# Patient Record
Sex: FEMALE | Race: WHITE | NOT HISPANIC OR LATINO | URBAN - METROPOLITAN AREA
[De-identification: names, ages, dates, MRNs, and addresses within clinical notes are randomized per-mention and may not be internally consistent; named-entity substitution may affect disease eponyms.]

---

## 2024-06-04 ENCOUNTER — APPOINTMENT (OUTPATIENT)
Dept: RADIOLOGY | Facility: HOSPITAL | Age: 89
End: 2024-06-04
Payer: MEDICARE

## 2024-06-04 ENCOUNTER — HOSPITAL ENCOUNTER (EMERGENCY)
Facility: HOSPITAL | Age: 89
Discharge: HOME | End: 2024-06-04
Payer: MEDICARE

## 2024-06-04 VITALS
SYSTOLIC BLOOD PRESSURE: 178 MMHG | RESPIRATION RATE: 18 BRPM | OXYGEN SATURATION: 96 % | DIASTOLIC BLOOD PRESSURE: 86 MMHG | TEMPERATURE: 96.4 F | HEART RATE: 75 BPM

## 2024-06-04 DIAGNOSIS — S01.81XA FACIAL LACERATION, INITIAL ENCOUNTER: Primary | ICD-10-CM

## 2024-06-04 DIAGNOSIS — S00.93XA TRAUMATIC CEPHALOHEMATOMA, INITIAL ENCOUNTER: ICD-10-CM

## 2024-06-04 PROCEDURE — 72125 CT NECK SPINE W/O DYE: CPT | Performed by: RADIOLOGY

## 2024-06-04 PROCEDURE — 70450 CT HEAD/BRAIN W/O DYE: CPT

## 2024-06-04 PROCEDURE — 2500000005 HC RX 250 GENERAL PHARMACY W/O HCPCS

## 2024-06-04 PROCEDURE — 72125 CT NECK SPINE W/O DYE: CPT

## 2024-06-04 PROCEDURE — 12011 RPR F/E/E/N/L/M 2.5 CM/<: CPT | Performed by: PHYSICIAN ASSISTANT

## 2024-06-04 PROCEDURE — 90715 TDAP VACCINE 7 YRS/> IM: CPT | Performed by: PHYSICIAN ASSISTANT

## 2024-06-04 PROCEDURE — 70450 CT HEAD/BRAIN W/O DYE: CPT | Performed by: RADIOLOGY

## 2024-06-04 PROCEDURE — 99285 EMERGENCY DEPT VISIT HI MDM: CPT | Mod: 25

## 2024-06-04 PROCEDURE — 2500000004 HC RX 250 GENERAL PHARMACY W/ HCPCS (ALT 636 FOR OP/ED): Performed by: PHYSICIAN ASSISTANT

## 2024-06-04 PROCEDURE — 90471 IMMUNIZATION ADMIN: CPT | Performed by: PHYSICIAN ASSISTANT

## 2024-06-04 RX ORDER — LIDOCAINE HYDROCHLORIDE 10 MG/ML
5 INJECTION INFILTRATION; PERINEURAL ONCE
Status: COMPLETED | OUTPATIENT
Start: 2024-06-04 | End: 2024-06-04

## 2024-06-04 RX ORDER — BACITRACIN ZINC 500 UNIT/G
OINTMENT (GRAM) TOPICAL 2 TIMES DAILY
Qty: 14 G | Refills: 0 | Status: SHIPPED | OUTPATIENT
Start: 2024-06-04 | End: 2024-06-11

## 2024-06-04 RX ORDER — LIDOCAINE HYDROCHLORIDE 10 MG/ML
INJECTION INFILTRATION; PERINEURAL
Status: COMPLETED
Start: 2024-06-04 | End: 2024-06-04

## 2024-06-04 RX ADMIN — LIDOCAINE HYDROCHLORIDE 50 MG: 10 INJECTION INFILTRATION; PERINEURAL at 11:20

## 2024-06-04 RX ADMIN — TETANUS TOXOID, REDUCED DIPHTHERIA TOXOID AND ACELLULAR PERTUSSIS VACCINE, ADSORBED 0.5 ML: 5; 2.5; 8; 8; 2.5 SUSPENSION INTRAMUSCULAR at 12:05

## 2024-06-04 RX ADMIN — LIDOCAINE HYDROCHLORIDE 50 MG: 10 INJECTION, SOLUTION INFILTRATION; PERINEURAL at 11:20

## 2024-06-04 ASSESSMENT — PAIN SCALES - GENERAL: PAINLEVEL_OUTOF10: 0 - NO PAIN

## 2024-06-04 ASSESSMENT — PAIN - FUNCTIONAL ASSESSMENT: PAIN_FUNCTIONAL_ASSESSMENT: 0-10

## 2024-06-04 ASSESSMENT — COLUMBIA-SUICIDE SEVERITY RATING SCALE - C-SSRS
2. HAVE YOU ACTUALLY HAD ANY THOUGHTS OF KILLING YOURSELF?: NO
6. HAVE YOU EVER DONE ANYTHING, STARTED TO DO ANYTHING, OR PREPARED TO DO ANYTHING TO END YOUR LIFE?: NO
1. IN THE PAST MONTH, HAVE YOU WISHED YOU WERE DEAD OR WISHED YOU COULD GO TO SLEEP AND NOT WAKE UP?: NO

## 2024-06-04 NOTE — ED TRIAGE NOTES
PT STATES THAT SHE HIT HER HEAD ON A DOOR FRAME TRIPPING OVER A PUPPY AND SPLIT HER FOREHEAD OPEN. PT DENIES VISUAL CHANGES, N/V, DIZZINESS, NUMBNESS/TINGLING, LOC, BT, AND HA.

## 2024-06-04 NOTE — ED PROVIDER NOTES
HPI     CC: Head Laceration     HPI: Marci Gregg is a 89 y.o. female with past medical history of reflux, hyperlipidemia, hypertension, and asthma presents with neighbor after hitting her head on the door frame.  Patient reports that around 6 AM this morning, she was getting up to use the bathroom when a dog that she is dog sitting got trapped underneath her feet and she tripped into the door frame with her face.  She did not lose consciousness and does not take any blood thinners.  She initially went to [facility] however the wait was too long and they had not seen her, so they went to urgent care who sent her here.  She currently has no complaints or areas of pain or tenderness.  Only concerned about her facial laceration.  Denies changes in vision, headache, nausea, vomiting, chest pain, shortness of breath.    ROS: 10-point review of systems was performed and is otherwise negative except as noted in HPI.      Past Medical History: Noncontributory except per HPI     Past Surgical History: Noncontributory except per HPI     Family History: Reviewed and noncontributory     Social History:  Noncontributory except per HPI       No Known Allergies    Home Meds:   Current Outpatient Medications   Medication Instructions    bacitracin 500 unit/gram ointment Topical, 2 times daily        ED Triage Vitals [06/04/24 0838]   Temperature Heart Rate Respirations BP   35.8 °C (96.4 °F) 65 18 173/79      Pulse Ox Temp Source Heart Rate Source Patient Position   97 % Temporal Monitor Sitting      BP Location FiO2 (%)     Left arm --         Heart Rate:  [65-75]   Temperature:  [35.8 °C (96.4 °F)]   Respirations:  [18]   BP: (173-178)/(79-86)   Pulse Ox:  [96 %-97 %]      Physical Exam:  Physical Exam  Vitals and nursing note reviewed.   Constitutional:       General: She is not in acute distress.     Appearance: Normal appearance. She is not ill-appearing.   HENT:      Head:      Comments: Cephalhematoma to the forehead with  approximately 3 cm linear laceration between the eyebrows.  No active bleeding.  No exposed tendon.  No muscle damage.      Right Ear: External ear normal.      Left Ear: External ear normal.      Nose: Nose normal.      Mouth/Throat:      Mouth: Mucous membranes are moist.   Eyes:      Extraocular Movements: Extraocular movements intact.      Conjunctiva/sclera: Conjunctivae normal.      Pupils: Pupils are equal, round, and reactive to light.   Cardiovascular:      Rate and Rhythm: Normal rate and regular rhythm.      Pulses: Normal pulses.   Pulmonary:      Effort: Pulmonary effort is normal. No respiratory distress.      Breath sounds: Normal breath sounds.   Abdominal:      General: Abdomen is flat.      Palpations: Abdomen is soft.      Tenderness: There is no abdominal tenderness.   Musculoskeletal:         General: No swelling, tenderness or deformity. Normal range of motion.      Cervical back: Normal range of motion and neck supple. No tenderness.   Skin:     General: Skin is warm and dry.      Capillary Refill: Capillary refill takes less than 2 seconds.   Neurological:      General: No focal deficit present.      Mental Status: She is alert and oriented to person, place, and time.   Psychiatric:         Mood and Affect: Mood normal.          Diagnostic Results        Labs Reviewed - No data to display      CT head wo IV contrast   Final Result   No acute intracranial hemorrhage or mass-effect.        Minimal air in the frontal scalp likely reflecting laceration in the   setting of trauma.        MACRO:   None.        Signed by: Yamel Denis 6/4/2024 10:08 AM   Dictation workstation:   [id number]      CT cervical spine wo IV contrast   Final Result   No cervical vertebral displacement or acute displaced fracture.   Straightening of the cervical lordosis and multilevel   productive/degenerative changes noted.        Paraspinal/soft tissue findings as above.        MACRO:   None.        Signed by: Yamel  Cira 6/4/2024 10:42 AM   Dictation workstation:   [id number]                    Shane Coma Scale Score: 15                  Procedure  Laceration Repair    Performed by: Zena Bernal PA-C  Authorized by: Zena Bernal PA-C    Consent:     Consent obtained:  Verbal    Consent given by:  Patient    Risks, benefits, and alternatives were discussed: yes      Risks discussed:  Infection, need for additional repair, pain, poor cosmetic result, retained foreign body and tendon damage    Alternatives discussed:  No treatment, delayed treatment and observation  Universal protocol:     Procedure explained and questions answered to patient or proxy's satisfaction: yes      Relevant documents present and verified: yes      Test results available: yes      Imaging studies available: yes      Required blood products, implants, devices, and special equipment available: yes      Site/side marked: yes      Immediately prior to procedure, a time out was called: yes      Patient identity confirmed:  Verbally with patient  Anesthesia:     Anesthesia method:  Local infiltration    Local anesthetic:  Lidocaine 1% w/o epi  Laceration details:     Location:  Face    Face location:  Forehead    Length (cm):  2.5  Pre-procedure details:     Preparation:  Patient was prepped and draped in usual sterile fashion and imaging obtained to evaluate for foreign bodies  Exploration:     Limited defect created (wound extended): no      Hemostasis achieved with:  Direct pressure    Imaging obtained: x-ray      Imaging outcome: foreign body not noted      Wound exploration: wound explored through full range of motion and entire depth of wound visualized      Wound extent: no fascia violation noted, no foreign bodies/material noted, no muscle damage noted, no nerve damage noted and no underlying fracture noted      Contaminated: no    Treatment:     Area cleansed with:  Povidone-iodine    Amount of cleaning:  Standard    Irrigation solution:   Sterile saline    Irrigation method:  Pressure wash    Visualized foreign bodies/material removed: no      Debridement:  None    Undermining:  None  Skin repair:     Repair method:  Sutures    Suture size:  5-0    Suture material:  Prolene    Suture technique:  Simple interrupted    Number of sutures:  10  Repair type:     Repair type:  Simple  Post-procedure details:     Dressing:  Open (no dressing)    Procedure completion:  Tolerated  Comments:      Completed by PA student       ED Course & MDM   Assessment/Plan:     Medications   lidocaine (Xylocaine) 10 mg/mL (1 %) injection 50 mg (50 mg intradermal Given 6/4/24 1120)   diphth,pertus(acell),tetanus (BoostRIX) 2.5-8-5 Lf-mcg-Lf/0.5mL vaccine 0.5 mL (0.5 mL intramuscular Given 6/4/24 1205)        Diagnoses as of 06/04/24 1259   Traumatic cephalohematoma, initial encounter   Facial laceration, initial encounter       Medical Decision Making    Marci Gregg is a 89 y.o. female with past medical history of reflux, hyperlipidemia, hypertension, and asthma presents with neighbor after hitting her head on the door frame.  Patient is nontoxic-appearing and vital signs are normal.  Based on her presentation, differential diagnosis includes traumatic brain injury, cervical spine injury, or no injury in addition to laceration/cephalhematoma.  Due to patient's age, will obtain CT head and CT C-spine without contrast.  These were obtained and showed no acute fracture or bleeding.  Patient's laceration was closed, see procedure note by student.  No further workup indicated at this time.    Laceration: Patient was educated that her CT scans were negative for acute traumatic injury.  We discussed laceration care with twice a day soap and water cleansing and keeping it dry and clean.  Would recommend wearing a hat in the summertime to avoid excessive sun exposure.  Her tetanus was updated in the emergency department today.  Also given a prescription for bacitracin placed over  the area.  Since this is a very clean wound with close follow-up with her outpatient PA, do not feel antibiotics are needed at this time.  We discussed that the sutures will need to be removed in approximately 7 days and can be done by her primary care.  Patient is agreeable to make an appointment with her primary care.  If she does develop any redness, warmth, drainage, increasing pain, or swelling, recommended returning to the emergency department for reevaluation for possible infection.  Patient agreeable to plan of care and felt comfortable returning home.  Discharged home in the care of her daughter.    Disposition: Home    ED Prescriptions       Medication Sig Dispense Start Date End Date Auth. Provider    bacitracin 500 unit/gram ointment Apply topically 2 times a day for 7 days. 14 g 6/4/2024 6/11/2024 Zena Bernal PA-C            Social Determinants Affecting Care: none     eZna Bernal PA-C    This note was dictated by speech recognition. Minor errors in transcription may be present.     Zena Bernal PA-C  06/04/24 2461